# Patient Record
Sex: MALE | Race: WHITE | NOT HISPANIC OR LATINO | URBAN - METROPOLITAN AREA
[De-identification: names, ages, dates, MRNs, and addresses within clinical notes are randomized per-mention and may not be internally consistent; named-entity substitution may affect disease eponyms.]

---

## 2019-06-14 ENCOUNTER — OFFICE VISIT CONVERTED (OUTPATIENT)
Dept: FAMILY MEDICINE CLINIC | Facility: CLINIC | Age: 54
End: 2019-06-14
Attending: FAMILY MEDICINE

## 2019-10-24 ENCOUNTER — HOSPITAL ENCOUNTER (OUTPATIENT)
Dept: LAB | Facility: HOSPITAL | Age: 54
Discharge: HOME | End: 2019-10-24
Attending: FAMILY MEDICINE

## 2019-10-24 LAB
25(OH)D3 SERPL-MCNC: 22.4 NG/ML (ref 30–100)
ALBUMIN SERPL-MCNC: 4.3 G/DL (ref 3.5–5)
ALBUMIN/GLOB SERPL: 1.4 {RATIO} (ref 1.4–2.6)
ALP SERPL-CCNC: 69 U/L (ref 56–119)
ALT SERPL-CCNC: 19 U/L (ref 10–40)
ANION GAP SERPL CALC-SCNC: 21 MMOL/L (ref 8–19)
APPEARANCE UR: CLEAR
AST SERPL-CCNC: 18 U/L (ref 15–50)
BASOPHILS # BLD AUTO: 0.04 10*3/UL (ref 0–0.2)
BASOPHILS NFR BLD AUTO: 0.5 % (ref 0–3)
BILIRUB SERPL-MCNC: 0.29 MG/DL (ref 0.2–1.3)
BILIRUB UR QL: NEGATIVE
BUN SERPL-MCNC: 12 MG/DL (ref 5–25)
BUN/CREAT SERPL: 16 {RATIO} (ref 6–20)
CALCIUM SERPL-MCNC: 9.6 MG/DL (ref 8.7–10.4)
CHLORIDE SERPL-SCNC: 102 MMOL/L (ref 99–111)
COLOR UR: YELLOW
CONV ABS IMM GRAN: 0.03 10*3/UL (ref 0–0.2)
CONV CO2: 24 MMOL/L (ref 22–32)
CONV COLLECTION SOURCE (UA): NORMAL
CONV CREATININE URINE, RANDOM: 62.2 MG/DL (ref 10–300)
CONV IMMATURE GRAN: 0.4 % (ref 0–1.8)
CONV MICROALBUM.,U,RANDOM: <12 MG/L (ref 0–20)
CONV TOTAL PROTEIN: 7.4 G/DL (ref 6.3–8.2)
CONV UROBILINOGEN IN URINE BY AUTOMATED TEST STRIP: 0.2 {EHRLICHU}/DL (ref 0.1–1)
CREAT UR-MCNC: 0.75 MG/DL (ref 0.7–1.2)
DEPRECATED RDW RBC AUTO: 43.8 FL (ref 35.1–43.9)
EOSINOPHIL # BLD AUTO: 0.18 10*3/UL (ref 0–0.7)
EOSINOPHIL # BLD AUTO: 2.3 % (ref 0–7)
ERYTHROCYTE [DISTWIDTH] IN BLOOD BY AUTOMATED COUNT: 12.6 % (ref 11.6–14.4)
EST. AVERAGE GLUCOSE BLD GHB EST-MCNC: 114 MG/DL
GFR SERPLBLD BASED ON 1.73 SQ M-ARVRAT: >60 ML/MIN/{1.73_M2}
GLOBULIN UR ELPH-MCNC: 3.1 G/DL (ref 2–3.5)
GLUCOSE SERPL-MCNC: 105 MG/DL (ref 70–99)
GLUCOSE UR QL: NEGATIVE MG/DL
HBA1C MFR BLD: 5.6 % (ref 3.5–5.7)
HCT VFR BLD AUTO: 44 % (ref 42–52)
HGB BLD-MCNC: 14.3 G/DL (ref 14–18)
HGB UR QL STRIP: NEGATIVE
KETONES UR QL STRIP: NEGATIVE MG/DL
LEUKOCYTE ESTERASE UR QL STRIP: NEGATIVE
LYMPHOCYTES # BLD AUTO: 1.84 10*3/UL (ref 1–5)
LYMPHOCYTES NFR BLD AUTO: 23.4 % (ref 20–45)
MCH RBC QN AUTO: 30.8 PG (ref 27–31)
MCHC RBC AUTO-ENTMCNC: 32.5 G/DL (ref 33–37)
MCV RBC AUTO: 94.6 FL (ref 80–96)
MICROALBUMIN/CREAT UR: 19.3 MG/G{CRE} (ref 0–25)
MONOCYTES # BLD AUTO: 0.62 10*3/UL (ref 0.2–1.2)
MONOCYTES NFR BLD AUTO: 7.9 % (ref 3–10)
NEUTROPHILS # BLD AUTO: 5.14 10*3/UL (ref 2–8)
NEUTROPHILS NFR BLD AUTO: 65.5 % (ref 30–85)
NITRITE UR QL STRIP: NEGATIVE
NRBC CBCN: 0 % (ref 0–0.7)
OSMOLALITY SERPL CALC.SUM OF ELEC: 296 MOSM/KG (ref 273–304)
PH UR STRIP.AUTO: 6.5 [PH] (ref 5–8)
PLATELET # BLD AUTO: 255 10*3/UL (ref 130–400)
PMV BLD AUTO: 10.3 FL (ref 9.4–12.4)
POTASSIUM SERPL-SCNC: 4.2 MMOL/L (ref 3.5–5.3)
PROT UR QL: NEGATIVE MG/DL
PSA SERPL-MCNC: 0.4 NG/ML (ref 0–4)
RBC # BLD AUTO: 4.65 10*6/UL (ref 4.7–6.1)
SODIUM SERPL-SCNC: 143 MMOL/L (ref 135–147)
SP GR UR: 1.01 (ref 1–1.03)
TSH SERPL-ACNC: 1.17 M[IU]/L (ref 0.27–4.2)
WBC # BLD AUTO: 7.85 10*3/UL (ref 4.8–10.8)

## 2019-10-25 ENCOUNTER — OFFICE VISIT CONVERTED (OUTPATIENT)
Dept: FAMILY MEDICINE CLINIC | Facility: CLINIC | Age: 54
End: 2019-10-25
Attending: FAMILY MEDICINE

## 2019-11-04 ENCOUNTER — CONVERSION ENCOUNTER (OUTPATIENT)
Dept: SURGERY | Facility: CLINIC | Age: 54
End: 2019-11-04

## 2019-11-04 ENCOUNTER — OFFICE VISIT CONVERTED (OUTPATIENT)
Dept: SURGERY | Facility: CLINIC | Age: 54
End: 2019-11-04
Attending: SURGERY

## 2020-04-27 ENCOUNTER — HOSPITAL ENCOUNTER (OUTPATIENT)
Dept: LAB | Facility: HOSPITAL | Age: 55
Discharge: HOME | End: 2020-04-27
Attending: FAMILY MEDICINE

## 2020-04-27 LAB
25(OH)D3 SERPL-MCNC: 32.9 NG/ML (ref 30–100)
ALBUMIN SERPL-MCNC: 4.4 G/DL (ref 3.5–5)
ALBUMIN/GLOB SERPL: 1.2 {RATIO} (ref 1.4–2.6)
ALP SERPL-CCNC: 65 U/L (ref 56–119)
ALT SERPL-CCNC: 22 U/L (ref 10–40)
ANION GAP SERPL CALC-SCNC: 19 MMOL/L (ref 8–19)
APPEARANCE UR: CLEAR
AST SERPL-CCNC: 17 U/L (ref 15–50)
BASOPHILS # BLD AUTO: 0.05 10*3/UL (ref 0–0.2)
BASOPHILS NFR BLD AUTO: 0.6 % (ref 0–3)
BILIRUB SERPL-MCNC: 0.32 MG/DL (ref 0.2–1.3)
BILIRUB UR QL: NEGATIVE
BUN SERPL-MCNC: 9 MG/DL (ref 5–25)
BUN/CREAT SERPL: 11 {RATIO} (ref 6–20)
CALCIUM SERPL-MCNC: 9.9 MG/DL (ref 8.7–10.4)
CHLORIDE SERPL-SCNC: 97 MMOL/L (ref 99–111)
COLOR UR: YELLOW
CONV ABS IMM GRAN: 0.04 10*3/UL (ref 0–0.2)
CONV CO2: 28 MMOL/L (ref 22–32)
CONV COLLECTION SOURCE (UA): NORMAL
CONV CREATININE URINE, RANDOM: 66 MG/DL (ref 10–300)
CONV IMMATURE GRAN: 0.5 % (ref 0–1.8)
CONV MICROALBUM.,U,RANDOM: <12 MG/L (ref 0–20)
CONV TOTAL PROTEIN: 8.2 G/DL (ref 6.3–8.2)
CONV UROBILINOGEN IN URINE BY AUTOMATED TEST STRIP: 0.2 {EHRLICHU}/DL (ref 0.1–1)
CREAT UR-MCNC: 0.85 MG/DL (ref 0.7–1.2)
DEPRECATED RDW RBC AUTO: 42.5 FL (ref 35.1–43.9)
EOSINOPHIL # BLD AUTO: 0.37 10*3/UL (ref 0–0.7)
EOSINOPHIL # BLD AUTO: 4.3 % (ref 0–7)
ERYTHROCYTE [DISTWIDTH] IN BLOOD BY AUTOMATED COUNT: 12.2 % (ref 11.6–14.4)
EST. AVERAGE GLUCOSE BLD GHB EST-MCNC: 131 MG/DL
GFR SERPLBLD BASED ON 1.73 SQ M-ARVRAT: >60 ML/MIN/{1.73_M2}
GLOBULIN UR ELPH-MCNC: 3.8 G/DL (ref 2–3.5)
GLUCOSE SERPL-MCNC: 115 MG/DL (ref 70–99)
GLUCOSE UR QL: NEGATIVE MG/DL
HBA1C MFR BLD: 6.2 % (ref 3.5–5.7)
HCT VFR BLD AUTO: 48.5 % (ref 42–52)
HGB BLD-MCNC: 15.7 G/DL (ref 14–18)
HGB UR QL STRIP: NEGATIVE
KETONES UR QL STRIP: NEGATIVE MG/DL
LEUKOCYTE ESTERASE UR QL STRIP: NEGATIVE
LYMPHOCYTES # BLD AUTO: 2.05 10*3/UL (ref 1–5)
LYMPHOCYTES NFR BLD AUTO: 23.8 % (ref 20–45)
MCH RBC QN AUTO: 30.3 PG (ref 27–31)
MCHC RBC AUTO-ENTMCNC: 32.4 G/DL (ref 33–37)
MCV RBC AUTO: 93.6 FL (ref 80–96)
MICROALBUMIN/CREAT UR: 18.2 MG/G{CRE} (ref 0–25)
MONOCYTES # BLD AUTO: 0.74 10*3/UL (ref 0.2–1.2)
MONOCYTES NFR BLD AUTO: 8.6 % (ref 3–10)
NEUTROPHILS # BLD AUTO: 5.37 10*3/UL (ref 2–8)
NEUTROPHILS NFR BLD AUTO: 62.2 % (ref 30–85)
NITRITE UR QL STRIP: NEGATIVE
NRBC CBCN: 0 % (ref 0–0.7)
OSMOLALITY SERPL CALC.SUM OF ELEC: 290 MOSM/KG (ref 273–304)
PH UR STRIP.AUTO: 6 [PH] (ref 5–8)
PLATELET # BLD AUTO: 288 10*3/UL (ref 130–400)
PMV BLD AUTO: 10.3 FL (ref 9.4–12.4)
POTASSIUM SERPL-SCNC: 4.3 MMOL/L (ref 3.5–5.3)
PROT UR QL: NEGATIVE MG/DL
RBC # BLD AUTO: 5.18 10*6/UL (ref 4.7–6.1)
SODIUM SERPL-SCNC: 140 MMOL/L (ref 135–147)
SP GR UR: 1.01 (ref 1–1.03)
TSH SERPL-ACNC: 1.64 M[IU]/L (ref 0.27–4.2)
WBC # BLD AUTO: 8.62 10*3/UL (ref 4.8–10.8)

## 2020-04-28 ENCOUNTER — OFFICE VISIT CONVERTED (OUTPATIENT)
Dept: FAMILY MEDICINE CLINIC | Facility: CLINIC | Age: 55
End: 2020-04-28
Attending: FAMILY MEDICINE

## 2020-09-22 ENCOUNTER — OFFICE VISIT CONVERTED (OUTPATIENT)
Dept: FAMILY MEDICINE CLINIC | Facility: CLINIC | Age: 55
End: 2020-09-22
Attending: FAMILY MEDICINE

## 2020-09-23 ENCOUNTER — HOSPITAL ENCOUNTER (OUTPATIENT)
Dept: OTHER | Facility: HOSPITAL | Age: 55
Discharge: HOME | End: 2020-09-23
Attending: FAMILY MEDICINE

## 2020-09-23 LAB
CHOLEST SERPL-MCNC: 164 MG/DL (ref 107–200)
CHOLEST/HDLC SERPL: 3.7 {RATIO} (ref 3–6)
HDLC SERPL-MCNC: 44 MG/DL (ref 40–60)
LDLC SERPL CALC-MCNC: 101 MG/DL (ref 70–100)
TRIGL SERPL-MCNC: 95 MG/DL (ref 40–150)
VLDLC SERPL-MCNC: 19 MG/DL (ref 5–37)

## 2020-09-28 ENCOUNTER — HOSPITAL ENCOUNTER (OUTPATIENT)
Dept: CT IMAGING | Facility: HOSPITAL | Age: 55
Discharge: HOME | End: 2020-09-28
Attending: FAMILY MEDICINE

## 2020-09-28 LAB
CREAT BLD-MCNC: 0.7 MG/DL (ref 0.6–1.4)
GFR SERPLBLD BASED ON 1.73 SQ M-ARVRAT: >60 ML/MIN/{1.73_M2}

## 2020-10-02 ENCOUNTER — CONVERSION ENCOUNTER (OUTPATIENT)
Dept: FAMILY MEDICINE CLINIC | Facility: CLINIC | Age: 55
End: 2020-10-02

## 2020-10-02 ENCOUNTER — OFFICE VISIT CONVERTED (OUTPATIENT)
Dept: FAMILY MEDICINE CLINIC | Facility: CLINIC | Age: 55
End: 2020-10-02
Attending: FAMILY MEDICINE

## 2020-10-07 ENCOUNTER — OFFICE VISIT CONVERTED (OUTPATIENT)
Dept: CARDIOLOGY | Facility: CLINIC | Age: 55
End: 2020-10-07
Attending: INTERNAL MEDICINE

## 2020-10-13 ENCOUNTER — HOSPITAL ENCOUNTER (OUTPATIENT)
Dept: NUCLEAR MEDICINE | Facility: HOSPITAL | Age: 55
Discharge: HOME | End: 2020-10-13
Attending: INTERNAL MEDICINE

## 2020-10-14 ENCOUNTER — CONVERSION ENCOUNTER (OUTPATIENT)
Dept: CARDIOLOGY | Facility: CLINIC | Age: 55
End: 2020-10-14
Attending: INTERNAL MEDICINE

## 2020-10-23 ENCOUNTER — HOSPITAL ENCOUNTER (OUTPATIENT)
Dept: PREADMISSION TESTING | Facility: HOSPITAL | Age: 55
Discharge: HOME | End: 2020-10-23
Attending: SURGERY

## 2020-10-26 LAB — SARS-COV-2 RNA SPEC QL NAA+PROBE: NOT DETECTED

## 2020-11-05 ENCOUNTER — OFFICE VISIT CONVERTED (OUTPATIENT)
Dept: FAMILY MEDICINE CLINIC | Facility: CLINIC | Age: 55
End: 2020-11-05
Attending: FAMILY MEDICINE

## 2020-11-23 ENCOUNTER — HOSPITAL ENCOUNTER (OUTPATIENT)
Dept: GENERAL RADIOLOGY | Facility: HOSPITAL | Age: 55
Discharge: HOME | End: 2020-11-23
Attending: FAMILY MEDICINE

## 2020-11-23 ENCOUNTER — TELEPHONE CONVERTED (OUTPATIENT)
Dept: GASTROENTEROLOGY | Facility: CLINIC | Age: 55
End: 2020-11-23
Attending: NURSE PRACTITIONER

## 2021-01-07 ENCOUNTER — HOSPITAL ENCOUNTER (OUTPATIENT)
Dept: LAB | Facility: HOSPITAL | Age: 56
Discharge: HOME | End: 2021-01-07
Attending: FAMILY MEDICINE

## 2021-01-07 LAB
25(OH)D3 SERPL-MCNC: 38.7 NG/ML (ref 30–100)
ALBUMIN SERPL-MCNC: 4.2 G/DL (ref 3.5–5)
ALBUMIN/GLOB SERPL: 1.1 {RATIO} (ref 1.4–2.6)
ALP SERPL-CCNC: 66 U/L (ref 56–119)
ALT SERPL-CCNC: 29 U/L (ref 10–40)
ANION GAP SERPL CALC-SCNC: 18 MMOL/L (ref 8–19)
APPEARANCE UR: CLEAR
AST SERPL-CCNC: 20 U/L (ref 15–50)
BASOPHILS # BLD AUTO: 0.03 10*3/UL (ref 0–0.2)
BASOPHILS NFR BLD AUTO: 0.3 % (ref 0–3)
BILIRUB SERPL-MCNC: 0.22 MG/DL (ref 0.2–1.3)
BILIRUB UR QL: NEGATIVE
BUN SERPL-MCNC: 7 MG/DL (ref 5–25)
BUN/CREAT SERPL: 8 {RATIO} (ref 6–20)
CALCIUM SERPL-MCNC: 9.9 MG/DL (ref 8.7–10.4)
CHLORIDE SERPL-SCNC: 99 MMOL/L (ref 99–111)
CHOLEST SERPL-MCNC: 129 MG/DL (ref 107–200)
CHOLEST/HDLC SERPL: 2.9 {RATIO} (ref 3–6)
COLOR UR: YELLOW
CONV ABS IMM GRAN: 0.04 10*3/UL (ref 0–0.2)
CONV CO2: 26 MMOL/L (ref 22–32)
CONV COLLECTION SOURCE (UA): NORMAL
CONV CREATININE URINE, RANDOM: 63.9 MG/DL (ref 10–300)
CONV IMMATURE GRAN: 0.4 % (ref 0–1.8)
CONV MICROALBUM.,U,RANDOM: <12 MG/L (ref 0–20)
CONV TOTAL PROTEIN: 7.9 G/DL (ref 6.3–8.2)
CONV UROBILINOGEN IN URINE BY AUTOMATED TEST STRIP: 0.2 {EHRLICHU}/DL (ref 0.1–1)
CREAT UR-MCNC: 0.9 MG/DL (ref 0.7–1.2)
DEPRECATED RDW RBC AUTO: 42.3 FL (ref 35.1–43.9)
EOSINOPHIL # BLD AUTO: 0.25 10*3/UL (ref 0–0.7)
EOSINOPHIL # BLD AUTO: 2.5 % (ref 0–7)
ERYTHROCYTE [DISTWIDTH] IN BLOOD BY AUTOMATED COUNT: 12.4 % (ref 11.6–14.4)
EST. AVERAGE GLUCOSE BLD GHB EST-MCNC: 128 MG/DL
GFR SERPLBLD BASED ON 1.73 SQ M-ARVRAT: >60 ML/MIN/{1.73_M2}
GLOBULIN UR ELPH-MCNC: 3.7 G/DL (ref 2–3.5)
GLUCOSE SERPL-MCNC: 103 MG/DL (ref 70–99)
GLUCOSE UR QL: NEGATIVE MG/DL
HBA1C MFR BLD: 6.1 % (ref 3.5–5.7)
HCT VFR BLD AUTO: 46.1 % (ref 42–52)
HDLC SERPL-MCNC: 44 MG/DL (ref 40–60)
HGB BLD-MCNC: 14.8 G/DL (ref 14–18)
HGB UR QL STRIP: NEGATIVE
KETONES UR QL STRIP: NEGATIVE MG/DL
LDLC SERPL CALC-MCNC: 71 MG/DL (ref 70–100)
LEUKOCYTE ESTERASE UR QL STRIP: NEGATIVE
LYMPHOCYTES # BLD AUTO: 1.55 10*3/UL (ref 1–5)
LYMPHOCYTES NFR BLD AUTO: 15.8 % (ref 20–45)
MCH RBC QN AUTO: 29.8 PG (ref 27–31)
MCHC RBC AUTO-ENTMCNC: 32.1 G/DL (ref 33–37)
MCV RBC AUTO: 92.9 FL (ref 80–96)
MICROALBUMIN/CREAT UR: 18.8 MG/G{CRE} (ref 0–25)
MONOCYTES # BLD AUTO: 0.65 10*3/UL (ref 0.2–1.2)
MONOCYTES NFR BLD AUTO: 6.6 % (ref 3–10)
NEUTROPHILS # BLD AUTO: 7.32 10*3/UL (ref 2–8)
NEUTROPHILS NFR BLD AUTO: 74.4 % (ref 30–85)
NITRITE UR QL STRIP: NEGATIVE
NRBC CBCN: 0 % (ref 0–0.7)
OSMOLALITY SERPL CALC.SUM OF ELEC: 286 MOSM/KG (ref 273–304)
PH UR STRIP.AUTO: 6 [PH] (ref 5–8)
PLATELET # BLD AUTO: 303 10*3/UL (ref 130–400)
PMV BLD AUTO: 9.9 FL (ref 9.4–12.4)
POTASSIUM SERPL-SCNC: 3.9 MMOL/L (ref 3.5–5.3)
PROT UR QL: NEGATIVE MG/DL
PSA SERPL-MCNC: 0.8 NG/ML (ref 0–4)
RBC # BLD AUTO: 4.96 10*6/UL (ref 4.7–6.1)
SODIUM SERPL-SCNC: 139 MMOL/L (ref 135–147)
SP GR UR: 1.01 (ref 1–1.03)
TRIGL SERPL-MCNC: 70 MG/DL (ref 40–150)
TSH SERPL-ACNC: 1.08 M[IU]/L (ref 0.27–4.2)
VLDLC SERPL-MCNC: 14 MG/DL (ref 5–37)
WBC # BLD AUTO: 9.84 10*3/UL (ref 4.8–10.8)

## 2021-05-10 NOTE — H&P
"   History and Physical      Patient Name: Cr Millan   Patient ID: [id number]   Sex: Male   YOB: 1965    Primary Care Provider: Eugenio Jo MD   Referring Provider: Eugenio Jo MD    Visit Date: November 23, 2020    Provider: NIR Sinclair   Location: [facility]   Location Address: [address] [place] [address]  [id number]   Location Phone: [phone]          Chief Complaint  · Knot on left side   · constipation       History Of Present Illness  TELEHEALTH TELEPHONE VISIT  Cr Millan is a 54 year old /White male who is presenting for evaluation via telehealth telephone visit. Verbal consent obtained before beginning visit.   Provider spent 12 minutes with the patient during the telehealth visit.   The following staff were present during this visit: Patrick Suarez MA; Cynthia BARBOUR   Past Medical History/ Overview of Patient Symptoms     New pt states he noticed pain on left side, caused him to rub area, and then noticed a \"knot\" under rib cage. Pt states pain was more assoc w straining and is now resolved, but still feels a knot present. He saw PCP, CT of the chest and abdomen with contrast were negative 9/28/2020. NO blood in stool or black stool. No unint wt loss. No c/o HB or n/v. Pt states he does have a lot of constipation. Pt states he was referred to Dr Trey contreras and was set up for colonoscopy.  Last colonoscopy 2014, hx polyps.         Past Medical History  Anxiety and depression; Closed fracture of lateral malleolus of right ankle; Colon polyp; Diabetes Mellitus, Type II; Fracture: Bimalleolar Ankle; Insomnia; Pain in joint; ankle and foot; Pain: Hip         Past Surgical History  Arthroscopic knee surgery, left; Arthroscopic knee surgery, right; Arthroscopic shoulder surgery, left; Carpal Tunnel Release; Finger Surgery; Lumbar disc surgery         Medication List  Name Date Started Instructions   Abilify 20 mg oral " tablet  take 1 tablet (20 mg) by oral route once daily   Aspirin Low Dose 81 mg oral tablet,delayed release (DR/EC)  take 1 tablet (81 mg) by oral route once daily   duloxetine 60 mg oral capsule,delayed release(DR/EC)  take 2 capsules (120 mg) by oral route once daily   lisinopril 10 mg oral tablet 10/29/2020 TAKE 1 TABLET BY MOUTH EVERY DAY   Medrol (Joseph) 4 mg oral tablets,dose pack 11/05/2020 take by oral route as directed per package instructions   meloxicam 7.5 mg oral tablet 11/05/2020 take 1 tablet (7.5 mg) by oral route once daily for 30 days   metformin 850 mg oral tablet 10/06/2020 TAKE 1 TABLET BY MOUTH TWICE A DAY WITH MORNING AND EVENING MEALS   simvastatin 20 mg oral tablet 09/15/2020 TAKE 1 TABLET BY MOUTH EVERY DAY IN THE EVENING   VITAMIN D3 50,000 UNIT CAPSULE 10/05/2020 TAKE 1 CAPSULE BY MOUTH ONCE WEEKLY         Allergy List  Keflex       Allergies Reconciled  Family Medical History  Cancer, Unspecified; Hypertension; Kidney Disease; No family history of colorectal cancer         Social History  Alcohol (Former); Tobacco (Former)         Immunizations  Name Date Admin   Influenza 10/02/2020   Influenza 10/25/2019   Influenza Refused   [id number] 04/28/2020   Tdap 04/28/2020             Assessment  · History of colon polyps     V12.72/Z86.010  · LUQ abdominal pain     789.02/R10.12  resolved, but describes feeling abnormality  · Constipation     564.00/K59.00      Plan  · Medications  o NuLYTELY with Flavor Packs 420 gram oral recon soln   SIG: take as directed for 1 day per office instructions   DISP: (1) Box with 0 refills  Prescribed on 11/23/2020     · Instructions  o Plan Of Care:   o Patient instructed to seek medical attention urgently for new or worsening symptoms.  o Call the office with any concerns or questions.  o Colonoscopy r/b d/w pt for hx colon polyps; anesthesia per IV protocol ; noted there is already a cardiac clearance in chart.   o I explained to pt that he should either see  us or Dr Yang for colonoscopy, but not both. Pt opted to stay with us.   o Recommended Colace and/or Miralax OTC for constipation, if not effective, please call back.             Electronically Signed by: NIR Sinclair -Author on November 23, 2020 10:56:45 AM

## 2021-05-10 NOTE — H&P
History and Physical      Patient Name: Cr Millan   Patient ID: [id number]   Sex: Male   YOB: 1965    Primary Care Provider: Eugenio Jo MD   Referring Provider: Eugenio Jo MD    Visit Date: October 7, 2020    Provider: Humberto Dunne MD   Location: [facility]   Location Address: [address] [place] [address], [address]   [place]  [id number]   Location Phone: [phone]          History Of Present Illness  Consult requested by: Eugenio Jo MD   I saw Cr Millan in the office today. This is a 54 year old, /White male. He has cardiac risk factors including diabetes, hypertension and dyslipidemia. He has been referred for chest tightness. The patient has had intermittent chest tightness with exertion, moderately intense, associated sometimes with nausea and abdominal cramping. He has shortness of breath with exertion. He has had a cardiac workup. However, it has been several years ago. He had a cardiac catheterization, which was normal, in [place]; this was around 2012.   PAST MEDICAL HISTORY: Diabetes; hypertension; dyslipidemia; morbid obesity. PAST SURGICAL HISTORY: Carpel tunnel surgery; meniscus tear surgery; gynecomastia.   PSYCHOSOCIAL HISTORY: He quit smoking in 1991. No alcohol. Moderate caffeine. He is .   FAMILY HISTORY: Positive for diabetes and hypertension.   CURRENT MEDICATIONS: include Abilify 20 mg daily; Duloxetine 60 mg b.i.d.; Simvastatin 20 mg daily; Metformin 850 mg b.i.d.; Lisinopril 10 mg daily. The dosage and frequency of the medications were reviewed with the patient.   ALLERGIES: Keflex.       Review of Systems  · Constitutional  o Admits  o : fatigue  o Denies  o : good general health lately, recent weight changes   · Eyes  o Denies  o : double vision  · HENT  o Denies  o : hearing loss or ringing, chronic sinus problem, swollen glands in neck  · Cardiovascular  o Admits  o : chest pain, swelling (feet, ankles, hands), shortness  "of breath while walking or lying flat  o Denies  o : palpitations (fast, fluttering, or skipping beats)  · Respiratory  o Admits  o : COPD  o Denies  o : chronic or frequent cough, asthma or wheezing  · Gastrointestinal  o Denies  o : ulcers, nausea or vomiting  · Neurologic  o Denies  o : lightheaded or dizzy, stroke, headaches  · Musculoskeletal  o Admits  o : joint pain, back pain  · Endocrine  o Admits  o : diabetes, excessive thirst or urination  o Denies  o : thyroid disease, heat or cold intolerance  · Heme-Lymph  o Denies  o : bleeding or bruising tendency, anemia      Vitals  Date Time BP Position Site L\R Cuff Size HR RR TEMP (F) WT  HT  BMI kg/m2 BSA m2 O2 Sat FR L/min FiO2 HC       10/07/2020 08:55 /70 Sitting       284lbs 0oz 5'  8\" 43.18 2.49             Physical Examination  · Constitutional  o Appearance  o : Obese, white male, pleasant, in no acute distress.  · Head and Face  o HEENT  o : No pallor, anicteric. Eyes normal. Moist mucous membranes.  · Neck  o Inspection/Palpation  o : Supple. No hepatosplenomegaly.  o Jugular Veins  o : No JVD. No carotid bruits.  · Respiratory  o Auscultation of Lungs  o : Clear to auscultation bilaterally. No crackles or wheezing.  · Cardiovascular  o Heart  o : S1, S2 is normally heard. No S3. No rubs or gallops. He has a soft basal systolic murmur.  · Gastrointestinal  o Abdominal Examination  o : Soft, non-distended. No palpable hepatosplenomegaly. Bowel sounds heard in all four quadrants.  · Musculoskeletal  o General  o : Normal muscle tone and strength.  · Skin and Subcutaneous Tissue  o General Inspection  o : No skin rashes.  · Extremities  o Extremities  o : Warm and well perfused. Distal pulses present. No pitting pedal edema.     I reviewed his primary care records.  His EKG showed sinus rhythm, right ventricular conduction delay, non-specific ST changes.    Most recent laboratory studies were reviewed:  , TRG 95, ; creatinine 0.7; " TSH/T4 normal.    Previous cardiac records were reviewed.           Assessment     1.  Intermittent exertional chest tightness with shortness of breath - The patient has multiple cardiac risk        factors, including diabetes, hypertension, dyslipidemia.  He has not had a cardiac workup that I can tell since       around 7329-1597.  His baseline EKG shows RV conduction delay but no acute ST changes.  2.  Hypertension - Controlled.  3.  Dyslipidemia - Borderline controlled, currently on Simvastatin for diabetic status.  4.  Morbid obesity.       Plan     Schedule echocardiogram to evaluate soft systolic murmur.  Stress imaging will be scheduled to evaluate for ischemic heart disease.  Follow up after diagnostics are completed.    Thank you for allowing us to participate in his care.    Sincerely,        JOSH reyes/tamra           This note was transcribed by Lisa Greene.  dmd/cbd  The above service was transcribed by Lisa Greene, and I attest to the accuracy of the note.  CBD.                Electronically Signed by: Lisa Greene-, -Author on October 8, 2020 05:18:20 AM  Electronically Co-signed by: Humberto Dunne MD -Reviewer on October 8, 2020 03:35:02 PM

## 2021-05-12 NOTE — PROGRESS NOTES
Progress Note      Patient Name: Cr Millan   Patient ID: [id number]   Sex: Male   YOB: 1965    Primary Care Provider: Eugenio Jo MD   Referring Provider: Eugenio Jo MD    Visit Date: April 28, 2020    Provider: Eugenio Jo MD   Location: [facility]   Location Address: [address] [place] [address], [address] [place] [address]  [id number]   Location Phone: [phone]          Chief Complaint  · Adult General Male Physical Exam      History Of Present Illness  Cr Millan is a 54 year old /White male who presents for evaluation and treatment of:      CPX       Past Medical History  Disease Name Date Onset Notes   Anxiety and depression --  --    Closed fracture of lateral malleolus of right ankle 04/14/2015 --    Diabetes mellitus, type II --  --    Fracture: Bimalleolar Ankle 03/02/2015 --    Insomnia --  --    Pain in joint; ankle and foot 2-24-15 right   Pain: Hip 01/05/2015 --          Past Surgical History  Procedure Name Date Notes   Arthroscopic knee surgery, left --  --    Arthroscopic knee surgery, right --  --    Arthroscopic shoulder surgery, left --  --    Carpal Tunnel Release --  --    Finger Surgery --  Trigger Finger Release   Lumbar disc surgery 2013 Dr. Bellamy         Medication List  Name Date Started Instructions   Abilify 15 mg oral tablet  take 1 tablet (15 mg) by oral route once daily   azithromycin 250 mg oral tablet 04/28/2020 take 2 tablets (500 mg) by oral route once daily for 1 day then 1 tablet (250 mg) by oral route once daily for 4 days   cholecalciferol (vitamin D3) 50,000 unit oral capsule 10/25/2019 take 1 capsule by oral route q weekly   citalopram 40 mg oral tablet  take 1 tablet (40 mg) by oral route once daily   duloxetine HCL 30 mg oral  --    lisinopril 10 mg oral tablet 01/30/2020 take 1 tablet (10 mg) by oral route once daily for 90 days   metformin 850 mg oral tablet 04/02/2020 take 1 tablet (850 mg) by oral route 2 times per day  "with morning and evening meals for 30 days   simvastatin 20 mg oral tablet 03/30/2020 take 1 tablet (20 mg) by oral route once daily in the evening for 90 days         Allergy List  Allergen Name Date Reaction Notes   Keflex --  --  --        Allergies Reconciled  Family Medical History  Disease Name Relative/Age Notes   Cancer, Unspecified Father/   --    Hypertension Mother/   --    Kidney Disease Father/   --          Social History  Finding Status Start/Stop Quantity Notes   Alcohol Former --/-- --  --    Tobacco Former --/-- --  quit 1991         Immunizations  NameDate Admin Mfg Trade Name Lot Number Route Inj VIS Given VIS Publication   Vyfaytcxd87/25/2019 [facility] Fluzone Quadrivalent [id number] IM LA 10/25/2019    Comments:    InfluenzaRefused 06/14/2019 NE Not Entered  NE NE     Comments:    Mmtphkntd8427/28/2020 MSD PNEUMOVAX [id number] IM LD 04/28/2020    Comments: pt tolerated inj well   Tdap04/28/2020 SKB BOOSTRIX [id number] IM RD 04/28/2020    Comments:          Review of Systems  · Constitutional  o Denies  o : fever, weight loss, weight gain  · Cardiovascular  o Denies  o : lower extremity edema, claudication, chest pressure, palpitations  · Respiratory  o Denies  o : shortness of breath, wheezing, cough, hemoptysis, dyspnea on exertion  · Gastrointestinal  o Denies  o : nausea, vomiting, diarrhea, constipation, abdominal pain      Vitals  Date Time BP Position Site L\R Cuff Size HR RR TEMP (F) WT  HT  BMI kg/m2 BSA m2 O2 Sat        04/28/2020 03:45 /65 Sitting    90 - R  98 277lbs 9oz 5'  8\" 42.2 2.46 98 %          Physical Examination  · Constitutional  o Appearance  o : alert, in no acute distress, well developed, well-nourished  · Head and Face  o Head  o : normocephalic, atraumatic, non tender, no palpable masses or nodules.  o Face  o : no facial lesions  · Eyes  o Vision  o : Acuity: grossly normal at distance, Conjuntivae: Normal, Sclerae white  · Ears, Nose, Mouth and Throat  o Ears  o : Ext. " Ears: Normal shape, Non tender, EACs: Normal , TMs: Normal, Hearing: intact to conversational voice bilaterally  o Throat  o : Oropharynx: no inflmation or lesions, Tonsils: within normal limits  · Respiratory  o Auscultation of Lungs  o : normal breath sounds throughout  · Cardiovascular  o Heart  o : Regular rate and rhythm, Normal S1,S2   · Gastrointestinal  o Abdominal Examination  o : abdomen soft, nontender, non distended, no rigidity, gaurding, rebound tenderness, no ventral or inguinal hernias present  · Skin and Subcutaneous Tissue  o General Inspection  o : no concerning moles or lesions  · Psychiatric  o Mood and Affect  o : normal mood and affect          Assessment  · Annual physical exam     V70.0/Z00.00  · Diabetes mellitus, type 2     250.00/E11.9  · Obesity     278.00/E66.9  · Vitamin D deficiency     268.9/E55.9  · Need for pneumococcal vaccination     V03.82/Z23  · Need for tetanus booster     V03.7/Z23  · Screening for prostate cancer     V76.44/Z12.5  · General Medical Exam, Adult (CPE)     V70.0/Z00.00  · Routine lab draw     V72.60/Z01.89  · Diabetic foot     250.80/E11.8       f/u as directed.    f/u in 6 months.  screening EKG in 6 months.      refer to podiatrist for diabetic foot care.              Plan  · Orders  o Hgb A1c [facility] ([id number]) - 250.00/E11.9, V72.60/Z01.89 - 10/28/2020  o Male Physical Primary Care Panel (CMP, CBC, TSH, Lipid, PSA) [facility] (24466, 18178, 97800, 29200, 04313, ) - V76.44/Z12.5, 250.00/E11.9, V72.60/Z01.89 - 10/28/2020  o Urinalysis with Reflex Microscopy if abnormal ([facility]) (77327) - 250.00/E11.9, V72.60/Z01.89 - 10/28/2020  o Vitamin D (25-Hydroxy) Level (32819) - 268.9/E55.9, V72.60/Z01.89 - 10/28/2020  o ACO-39: Current medications updated and reviewed () - - 04/28/2020  o ACO-14: Influenza immunization administered or previously received () - - 04/28/2020  o Microalbumin urine (10413) - 250.00/E11.9, V72.60/Z01.89 - 10/28/2020  o EKG with at least 12  leads, INTERPRETATION AND REPORT ONLY [facility] (76449) - 278.00/E66.9, 250.00/E11.9 - 04/28/2020  o PODIATRY CONSULTATION (PODIA) - 250.80/E11.8 - 04/28/2020  o Vxmgreefs43 Vaccine (06214) - V03.82/Z23 - 04/28/2020   Vaccine - [id number]; Dose: 0.5; Site: Left Deltoid; Route: Intramuscular; Date: 04/28/2020 16:58:00; Exp: 06/29/2021; Lot: [id number]; Mfg: [employer] TradeName: PNEUMOVAX 23; Administered By: Fernanda Snyder MA; Comment: pt tolerated inj well  o TDaP Vaccine - Age 7+ (61471) - V03.7/Z23 - 04/28/2020   Vaccine - Tdap; Dose: .5; Site: Right Deltoid; Route: Intramuscular; Date: 04/28/2020 16:57:00; Exp: 02/05/2022; Lot: [id number]; Mfg: [employer]; TradeName: BOOSTRIX; Administered By: Fernanda Snyder MA; Comment: N/A  o IM/SQ - Injection Fee [facility] (53194) - V03.7/Z23, V03.82/Z23 - 04/28/2020  · Medications  o Medications have been Reconciled  o Transition of Care or Provider Policy  · Instructions  o Reviewed health maintenance flowsheet and updated information. Orders were placed and/or patient's response was documented.  o Discussed with patient the need for tetanus vaccination.   o Patient was educated/instructed on their diagnosis, treatment and medications prior to discharge from the clinic today.  o Electronically Identified Patient Education Materials Provided Electronically  · Disposition  o Call or Return if symptoms worsen or persist.  o Care Transition            Electronically Signed by: Eugenio Jo MD -Author on April 28, 2020 05:18:08 PM

## 2021-05-13 NOTE — PROGRESS NOTES
Progress Note      Patient Name: Cr Millan   Patient ID: [id number]   Sex: Male   YOB: 1965    Primary Care Provider: Eugenio Jo MD   Referring Provider: Eugenio Jo MD    Visit Date: September 22, 2020    Provider: Eugenio Jo MD   Location: [facility]   Location Address: [address] [place] [address], [address]  [place], [place]  [id number]   Location Phone: [phone]          Chief Complaint     possible heart murmur          History Of Present Illness  Cr Millan is a 54 year old /White male who presents for evaluation and treatment of:      Pt presents for eval.    He presents today for eval.   He c/o upper left abdominal pain for past couple of weeks. He also noticed a mass like lesion he has felt in the area of discomfort. He denies fever or chills or weight loss.   He also c/o chest discomfort about 12 days ago. He went in for a wellness physical, and the specialist heard a murmur and wanted it further evaluated with PCP. Denies chest pain today. He is on lisinopril for BP..controlled.       Past Medical History  Disease Name Date Onset Notes   Anxiety and depression --  --    Closed fracture of lateral malleolus of right ankle 04/14/2015 --    Diabetes Mellitus, Type II --  --    Fracture: Bimalleolar Ankle 03/02/2015 --    Insomnia --  --    Pain in joint; ankle and foot 2-24-15 right   Pain: Hip 01/05/2015 --          Past Surgical History  Procedure Name Date Notes   Arthroscopic knee surgery, left --  --    Arthroscopic knee surgery, right --  --    Arthroscopic shoulder surgery, left --  --    Carpal Tunnel Release --  --    Finger Surgery --  Trigger Finger Release   Lumbar disc surgery 2013 Dr. Bellamy         Medication List  Name Date Started Instructions   Abilify 15 mg oral tablet  take 1 tablet (15 mg) by oral route once daily   cholecalciferol (vitamin D3) 50,000 unit oral capsule 10/25/2019 take 1 capsule by oral route q weekly  "  duloxetine HCL 30 mg oral  --    lisinopril 10 mg oral tablet 07/21/2020 TAKE 1 TABLET BY MOUTH EVERY DAY   metformin 850 mg oral tablet 07/14/2020 TAKE 1 TABLET BY MOUTH TWICE A DAY WITH MORNING AND EVENING MEALS   simvastatin 20 mg oral tablet 09/15/2020 TAKE 1 TABLET BY MOUTH EVERY DAY IN THE EVENING         Allergy List  Allergen Name Date Reaction Notes   Keflex --  --  --        Allergies Reconciled  Family Medical History  Disease Name Relative/Age Notes   Cancer, Unspecified Father/   --    Hypertension Mother/   --    Kidney Disease Father/   --          Social History  Finding Status Start/Stop Quantity Notes   Alcohol Former --/-- --  --    Tobacco Former --/-- --  quit 1991         Immunizations  NameDate Admin Mfg Trade Name Lot Number Route Inj VIS Given VIS Publication   Bdjxeeiow82/25/2019 [facility] Fluzone Quadrivalent [id number] IM LA 10/25/2019    Comments:    InfluenzaRefused 06/14/2019 NE Not Entered  NE NE     Comments:    Kibpgkapw3082/28/2020 MSD PNEUMOVAX [id number] IM LD 04/28/2020    Comments: pt tolerated inj well   Tdap04/28/2020 SKB BOOSTRIX [id number] IM RD 04/28/2020    Comments:          Review of Systems  · Constitutional  o Denies  o : fever, weight loss, weight gain  · Cardiovascular  o Denies  o : lower extremity edema, claudication, chest pressure, palpitations  · Respiratory  o Denies  o : hemoptysis, dyspnea on exertion  · Gastrointestinal  o Denies  o : nausea, vomiting      Vitals  Date Time BP Position Site L\R Cuff Size HR RR TEMP (F) WT  HT  BMI kg/m2 BSA m2 O2 Sat        09/22/2020 08:53 /76 Sitting    90 - R  97.6 281lbs 6oz 5'  8\" 42.78 2.47 98 %          Physical Examination  · Constitutional  o Appearance  o : alert, in no acute distress, well developed, well-nourished  · Head and Face  o Head  o : normocephalic, atraumatic, non tender, no palpable masses or nodules.  o Face  o : no facial lesions  · Eyes  o Vision  o : Acuity: grossly normal at distance, Conjuntivae: " Normal, Sclerae white  · Respiratory  o Auscultation of Lungs  o : normal breath sounds throughout  · Cardiovascular  o Heart  o : Regular rate and rhythm, Normal S1,S2   · Gastrointestinal  o Abdominal Examination  o : TTP left upper abdomen.   o Liver and spleen  o : no hepatomegaly present, liver nontender to palpation, spleen not palpable  · Psychiatric  o Mood and Affect  o : normal mood and affect              Assessment  · Abdominal pain     789.00/R10.9  · Abdominal mass     789.30/R19.00  · Chest wall mass     786.6/R22.2  · Chest pain     786.50/R07.9  · Intermittent chest pain     786.50/R07.9  · Heart murmur     785.2/R01.1  · Hyperlipidemia     272.4/E78.5  · Screening for depression     V79.0/Z13.31       Ct abdomen  CT chest.    Cardiology    lipid, and EKG.       Plan  · Orders  o CT Chest with IV Contrast [facility] (52676) - 789.00/R10.9, 789.30/R19.00, 786.6/R22.2, 786.50/R07.9 - 09/22/2020  o CT Abdomen with IV Contrast [facility]; suggest Oral Prep (57506) - 789.00/R10.9, 789.30/R19.00, 786.6/R22.2, 786.50/R07.9 - 09/22/2020  o CARDIOLOGY CONSULTATION (CARDI) - 786.50/R07.9, 785.2/R01.1 - 09/22/2020  o Lipid Panel [facility] (34750) - 272.4/E78.5 - 09/23/2020  o EKG with at least 12 leads, INTERPRETATION AND REPORT ONLY [facility] (90009) - 786.50/R07.9 - 09/23/2020  o Positive screen for clinical depression using a standardized tool and a follow-up plan documented () - V79.0/Z13.31 - 09/22/2020   pt scored 9 on PHQ9  o ACO-14: Influenza immunization administered or previously received () - - 09/29/2020  o ACO-39: Current medications updated and reviewed (, 4479L) - - 09/29/2020  · Medications  o azithromycin 250 mg oral tablet   SIG: take 2 tablets (500 mg) by oral route once daily for 1 day then 1 tablet (250 mg) by oral route once daily for 4 days   DISP: (6) tablets with 0 refills  Discontinued on 09/22/2020     o citalopram 40 mg oral tablet   SIG: take 1 tablet (40 mg) by oral route once daily   DISP:  (0) tablet with 0 refills  Discontinued on 09/22/2020     o Symbicort 160-4.5 mcg/actuation inhalation HFA aerosol inhaler   SIG: TAKE 2 PUFFS BY MOUTH TWICE A DAY IN THE MORNING AND IN THE EVENING   DISP: (10.2) Inhaler with 1 refills  Discontinued on 09/22/2020     o Medications have been Reconciled  o Transition of Care or Provider Policy  · Instructions  o Electronically Identified Patient Education Materials Provided Electronically  · Disposition  o Call or Return if symptoms worsen or persist.  o Care Transition            Electronically Signed by: Eugenio Jo MD -Author on September 29, 2020 04:25:16 AM

## 2021-05-13 NOTE — PROGRESS NOTES
Progress Note      Patient Name: Cr Millan   Patient ID: [id number]   Sex: Male   YOB: 1965    Primary Care Provider: Eugenio Jo MD   Referring Provider: Eugenio Jo MD    Visit Date: November 5, 2020    Provider: Eugenio Jo MD   Location: [facility] [place]   Location Address: [address] [place] [address], [address]  [place], [place]  [id number]   Location Phone: [phone]          Chief Complaint     Right wrist, hand, and arm pain       History Of Present Illness  Cr Millan is a 54 year old /White male who presents for evaluation and treatment of:      PT presents for evaluation.    He c/o right wrist pain...about 6/10 intermittesnt. Pain is mainly of the left wrist... radiates to hand. Per pt he feels like his  is weaker. He has as hx of carpal tunnel of the right hand that required surgery back in the 90s. He started a new job that requires typing (duringi the initial week) he works at [employer].and this is what flared up the hand/wrist.       Past Medical History  Disease Name Date Onset Notes   Anxiety and depression --  --    Closed fracture of lateral malleolus of right ankle 04/14/2015 --    Diabetes Mellitus, Type II --  --    Fracture: Bimalleolar Ankle 03/02/2015 --    Insomnia --  --    Pain in joint; ankle and foot 2-24-15 right   Pain: Hip 01/05/2015 --          Past Surgical History  Procedure Name Date Notes   Arthroscopic knee surgery, left --  --    Arthroscopic knee surgery, right --  --    Arthroscopic shoulder surgery, left --  --    Carpal Tunnel Release --  --    Finger Surgery --  Trigger Finger Release   Lumbar disc surgery 2013 Dr. Bellamy         Medication List  Name Date Started Instructions   Abilify 20 mg oral tablet  take 1 tablet (20 mg) by oral route once daily   Aspirin Low Dose 81 mg oral tablet,delayed release (DR/EC)  take 1 tablet (81 mg) by oral route once daily   duloxetine 60 mg oral capsule,delayed release(DR/EC)   take 2 capsules (120 mg) by oral route once daily   lisinopril 10 mg oral tablet 10/29/2020 TAKE 1 TABLET BY MOUTH EVERY DAY   Medrol (Joseph) 4 mg oral tablets,dose pack 11/05/2020 take by oral route as directed per package instructions   meloxicam 7.5 mg oral tablet 11/05/2020 take 1 tablet (7.5 mg) by oral route once daily for 30 days   metformin 850 mg oral tablet 10/06/2020 TAKE 1 TABLET BY MOUTH TWICE A DAY WITH MORNING AND EVENING MEALS   simvastatin 20 mg oral tablet 09/15/2020 TAKE 1 TABLET BY MOUTH EVERY DAY IN THE EVENING   VITAMIN D3 50,000 UNIT CAPSULE 10/05/2020 TAKE 1 CAPSULE BY MOUTH ONCE WEEKLY         Allergy List  Allergen Name Date Reaction Notes   Keflex --  --  --        Allergies Reconciled  Family Medical History  Disease Name Relative/Age Notes   Cancer, Unspecified Father/   --    Hypertension Mother/   --    Kidney Disease Father/   --          Social History  Finding Status Start/Stop Quantity Notes   Alcohol Former --/-- --  --    Tobacco Former --/-- --  quit 1991         Immunizations  NameDate Admin Mfg Trade Name Lot Number Route Inj VIS Given VIS Publication   Khoyrtgzf20/02/2020 PMC Fluzone Quadrivalent [id number] IM RD 10/02/2020 08/15/2019   Comments: patient tolerated well   Yuxmlgign0571/28/2020 MSD PNEUMOVAX 23 [id number] IM LD 04/28/2020    Comments: pt tolerated inj well   Tdap04/28/2020 SKB BOOSTRIX [id number] IM RD 04/28/2020    Comments:          Review of Systems  · Constitutional  o Denies  o : night sweats  · Eyes  o Denies  o : double vision, blurred vision  · HENT  o Denies  o : vertigo, recent head injury  · Breasts  o Denies  o : abnormal changes in breast size, additional breast symptoms except as noted in the HPI  · Cardiovascular  o Denies  o : chest pain, irregular heart beats  · Respiratory  o Denies  o : shortness of breath, productive cough  · Gastrointestinal  o Denies  o : nausea, vomiting  · Genitourinary  o Denies  o : dysuria, urinary  retention  · Integument  o Denies  o : hair growth change, new skin lesions  · Neurologic  o Denies  o : altered mental status, seizures  · Musculoskeletal  o Denies  o : joint swelling, limitation of motion  · Endocrine  o Denies  o : cold intolerance, heat intolerance  · Heme-Lymph  o Denies  o : petechiae, lymph node enlargement or tenderness  · Allergic-Immunologic  o Denies  o : frequent illnesses      Vitals  Date Time BP Position Site L\R Cuff Size HR RR TEMP (F) WT  HT  BMI kg/m2 BSA m2 O2 Sat FR L/min FiO2        11/05/2020 03:34 /74 Sitting    114 - R  98.3 276lbs 4oz    96 %            Physical Examination  · Constitutional  o Appearance  o : alert, in no acute distress, well developed, well-nourished  · Head and Face  o Head  o : normocephalic, atraumatic, non tender, no palpable masses or nodules.  o Face  o : no facial lesions  · Eyes  o Vision  o : Acuity: grossly normal at distance, Conjuntivae: Normal, Sclerae white  · Respiratory  o Auscultation of Lungs  o : normal breath sounds throughout  · Cardiovascular  o Heart  o : Regular rate and rhythm, Normal S1,S2   · Psychiatric  o Mood and Affect  o : normal mood and affect          Assessment  · Wrist pain     719.43/M25.539       f/u as directed  meloxicam  steroids.    if no better..he will be referred to hand specialist       Plan  · Orders  o ACO-39: Current medications updated and reviewed (, 1159F) - - 11/15/2020  o ACO-14: Influenza immunization administered or previously received [facility] () - - 11/15/2020  · Medications  o Medications have been Reconciled  o Transition of Care or Provider Policy  · Instructions  o Patient was educated/instructed on their diagnosis, treatment and medications prior to discharge from the clinic today.  o Electronically Identified Patient Education Materials Provided Electronically  · Disposition  o Call or Return if symptoms worsen or persist.  o Care Transition            Electronically Signed by:  Eugenio Jo MD -Author on November 15, 2020 09:42:11 PM

## 2021-05-13 NOTE — PROGRESS NOTES
Progress Note      Patient Name: Cr Millan   Patient ID: [id number]   Sex: Male   YOB: 1965    Primary Care Provider: Eugenio Jo MD   Referring Provider: Eugenio Jo MD    Visit Date: October 2, 2020    Provider: Eugenio Jo MD   Location: [facility]   Location Address: [address] [place] [address], [address]  [place], [place]  [id number]   Location Phone: [phone]          Chief Complaint     Follow up on CT       History Of Present Illness  Cr Millan is a 54 year old /White male who presents for evaluation and treatment of:      Pt presents for f/u.    He c/o upper left abdominal pain for past couple of weeks. He also noticed a mass like lesion he has felt in the area of discomfort. He denies fever or chills or weight loss. CT chest/abdomen was done..showed no masses or lesions.   He also c/o chest discomfort intermittent. EKG shows no acute issues.. He is scheudled with Cardiologist, but not until Nove.   He went in for a wellness physical, and the specialist heard a murmur and wanted it further evaluated with PCP. Denies chest pain today. He is on lisinopril for BP..controlled. Lipid panel is controlled.       Past Medical History  Disease Name Date Onset Notes   Anxiety and depression --  --    Closed fracture of lateral malleolus of right ankle 04/14/2015 --    Diabetes Mellitus, Type II --  --    Fracture: Bimalleolar Ankle 03/02/2015 --    Insomnia --  --    Pain in joint; ankle and foot 2-24-15 right   Pain: Hip 01/05/2015 --          Past Surgical History  Procedure Name Date Notes   Arthroscopic knee surgery, left --  --    Arthroscopic knee surgery, right --  --    Arthroscopic shoulder surgery, left --  --    Carpal Tunnel Release --  --    Finger Surgery --  Trigger Finger Release   Lumbar disc surgery 2013 Dr. Bellamy         Medication List  Name Date Started Instructions   Abilify 20 mg oral tablet  take 1 tablet (20 mg) by oral route  "once daily   cholecalciferol (vitamin D3) 50,000 unit oral capsule 10/25/2019 take 1 capsule by oral route q weekly   duloxetine 30 mg oral capsule,delayed release(DR/EC)  take 1 capsule (30 mg) by oral route 2 times per day   lisinopril 10 mg oral tablet 07/21/2020 TAKE 1 TABLET BY MOUTH EVERY DAY   metformin 850 mg oral tablet 07/14/2020 TAKE 1 TABLET BY MOUTH TWICE A DAY WITH MORNING AND EVENING MEALS   simvastatin 20 mg oral tablet 09/15/2020 TAKE 1 TABLET BY MOUTH EVERY DAY IN THE EVENING         Allergy List  Allergen Name Date Reaction Notes   Keflex --  --  --        Allergies Reconciled  Family Medical History  Disease Name Relative/Age Notes   Cancer, Unspecified Father/   --    Hypertension Mother/   --    Kidney Disease Father/   --          Social History  Finding Status Start/Stop Quantity Notes   Alcohol Former --/-- --  --    Tobacco Former --/-- --  quit 1991         Immunizations  NameDate Admin Mfg Trade Name Lot Number Route Inj VIS Given VIS Publication   Kupseffgg51/02/2020 PMC Fluzone Quadrivalent [id number] IM RD 10/02/2020 08/15/2019   Comments: patient tolerated well   Sgpwwjfpa5232/28/2020 MSD PNEUMOVAX 23 [id number] IM LD 04/28/2020    Comments: pt tolerated inj well   Tdap04/28/2020 SKB BOOSTRIX [id number] IM RD 04/28/2020    Comments:          Review of Systems  · Constitutional  o Denies  o : fever, weight loss, weight gain  · Cardiovascular  o Denies  o : lower extremity edema, claudication, chest pressure, palpitations  · Respiratory  o Denies  o : hemoptysis, dyspnea on exertion  · Gastrointestinal  o Denies  o : nausea, vomiting      Vitals  Date Time BP Position Site L\R Cuff Size HR RR TEMP (F) WT  HT  BMI kg/m2 BSA m2 O2 Sat FR L/min FiO2 HC       10/02/2020 08:43 /63 Sitting    106 - R  97.8 276lbs 8oz 5'  8\" 42.04 2.45 98 %            Physical Examination  · Constitutional  o Appearance  o : alert, in no acute distress, well developed, well-nourished  · Head and Face  o Head  o : " normocephalic, atraumatic, non tender, no palpable masses or nodules.  o Face  o : no facial lesions  · Eyes  o Vision  o : Acuity: grossly normal at distance, Conjuntivae: Normal, Sclerae white  · Respiratory  o Auscultation of Lungs  o : normal breath sounds throughout  · Cardiovascular  o Heart  o : Regular rate and rhythm, Normal S1,S2   · Psychiatric  o Mood and Affect  o : normal mood and affect          Assessment  · Abdominal pain     789.00/R10.9  · Need for influenza vaccination     V04.81/Z23  · Intermittent chest pain     786.50/R07.9       f/u as directed.  Cardiology move up sooner.  refer to Gastro.           Plan  · Orders  o Gastroenterology Consultation ([facility]STR) - 789.00/R10.9 - 10/02/2020  o Influenza Vaccine, Fluarix, Quadrivalent, age 6 months and up (85801) - V04.81/Z23 - 10/02/2020   Vaccine - Influenza; Dose: 0.5; Site: Right Deltoid; Route: Intramuscular; Date: 10/02/2020 09:37:00; Exp: 06/30/2021; Lot: [id number]; Mfg: [employer] pasteur; TradeName: Fluzone Quadrivalent; Administered By: Lucila Clay MA; Comment: patient tolerated well  o IM/SQ - Injection Fee [facility] (47413) - V04.81/Z23 - 10/02/2020  o ACO-14: Influenza immunization administered or previously received () - - 10/05/2020  o ACO-39: Current medications updated and reviewed (1159F, ) - - 10/05/2020  · Medications  o Medications have been Reconciled  o Transition of Care or Provider Policy  · Instructions  o Electronically Identified Patient Education Materials Provided Electronically  · Disposition  o Call or Return if symptoms worsen or persist.  o Care Transition            Electronically Signed by: Eugenio Jo MD -Author on October 5, 2020 03:48:02 AM

## 2021-05-14 VITALS
WEIGHT: 281.37 LBS | OXYGEN SATURATION: 98 % | SYSTOLIC BLOOD PRESSURE: 125 MMHG | DIASTOLIC BLOOD PRESSURE: 76 MMHG | BODY MASS INDEX: 42.64 KG/M2 | TEMPERATURE: 97.6 F | HEART RATE: 90 BPM | HEIGHT: 68 IN

## 2021-05-14 VITALS
SYSTOLIC BLOOD PRESSURE: 150 MMHG | OXYGEN SATURATION: 96 % | DIASTOLIC BLOOD PRESSURE: 74 MMHG | TEMPERATURE: 98.3 F | WEIGHT: 276.25 LBS | HEART RATE: 114 BPM

## 2021-05-14 VITALS
HEIGHT: 68 IN | DIASTOLIC BLOOD PRESSURE: 70 MMHG | SYSTOLIC BLOOD PRESSURE: 134 MMHG | WEIGHT: 284 LBS | BODY MASS INDEX: 43.04 KG/M2

## 2021-05-14 VITALS
DIASTOLIC BLOOD PRESSURE: 63 MMHG | WEIGHT: 276.5 LBS | BODY MASS INDEX: 41.91 KG/M2 | HEIGHT: 68 IN | SYSTOLIC BLOOD PRESSURE: 132 MMHG | OXYGEN SATURATION: 98 % | HEART RATE: 106 BPM | TEMPERATURE: 97.8 F

## 2021-05-15 VITALS
DIASTOLIC BLOOD PRESSURE: 60 MMHG | HEART RATE: 100 BPM | SYSTOLIC BLOOD PRESSURE: 133 MMHG | TEMPERATURE: 98 F | BODY MASS INDEX: 42.14 KG/M2 | HEIGHT: 68 IN | OXYGEN SATURATION: 98 % | WEIGHT: 278.06 LBS

## 2021-05-15 VITALS
HEIGHT: 68 IN | OXYGEN SATURATION: 98 % | HEART RATE: 90 BPM | SYSTOLIC BLOOD PRESSURE: 124 MMHG | DIASTOLIC BLOOD PRESSURE: 65 MMHG | WEIGHT: 277.56 LBS | TEMPERATURE: 98 F | BODY MASS INDEX: 42.07 KG/M2

## 2021-05-15 VITALS
TEMPERATURE: 97.3 F | WEIGHT: 267.12 LBS | DIASTOLIC BLOOD PRESSURE: 68 MMHG | SYSTOLIC BLOOD PRESSURE: 126 MMHG | BODY MASS INDEX: 40.48 KG/M2 | HEART RATE: 88 BPM | OXYGEN SATURATION: 96 % | HEIGHT: 68 IN

## 2021-05-15 VITALS — BODY MASS INDEX: 42.91 KG/M2 | RESPIRATION RATE: 16 BRPM | HEIGHT: 68 IN | WEIGHT: 283.12 LBS
